# Patient Record
Sex: MALE | Race: BLACK OR AFRICAN AMERICAN | NOT HISPANIC OR LATINO | Employment: OTHER | URBAN - METROPOLITAN AREA
[De-identification: names, ages, dates, MRNs, and addresses within clinical notes are randomized per-mention and may not be internally consistent; named-entity substitution may affect disease eponyms.]

---

## 2018-12-04 ENCOUNTER — HOSPITAL ENCOUNTER (EMERGENCY)
Facility: OTHER | Age: 53
Discharge: HOME | End: 2018-12-04
Attending: EMERGENCY MEDICINE
Payer: MEDICAID

## 2018-12-04 VITALS
HEART RATE: 80 BPM | OXYGEN SATURATION: 96 % | SYSTOLIC BLOOD PRESSURE: 119 MMHG | TEMPERATURE: 98 F | WEIGHT: 168 LBS | HEIGHT: 66 IN | RESPIRATION RATE: 18 BRPM | BODY MASS INDEX: 27 KG/M2 | DIASTOLIC BLOOD PRESSURE: 57 MMHG

## 2018-12-04 DIAGNOSIS — J45.901 EXACERBATION OF ASTHMA, UNSPECIFIED ASTHMA SEVERITY, UNSPECIFIED WHETHER PERSISTENT: Primary | ICD-10-CM

## 2018-12-04 DIAGNOSIS — R05.9 COUGH: ICD-10-CM

## 2018-12-04 DIAGNOSIS — R91.1 PULMONARY NODULE: ICD-10-CM

## 2018-12-04 LAB — BNP SERPL-MCNC: 75 PG/ML

## 2018-12-04 PROCEDURE — 99285 EMERGENCY DEPT VISIT HI MDM: CPT | Mod: 25

## 2018-12-04 PROCEDURE — 63600175 PHARM REV CODE 636 W HCPCS: Performed by: PHYSICIAN ASSISTANT

## 2018-12-04 PROCEDURE — 83880 ASSAY OF NATRIURETIC PEPTIDE: CPT

## 2018-12-04 PROCEDURE — 25000242 PHARM REV CODE 250 ALT 637 W/ HCPCS: Performed by: PHYSICIAN ASSISTANT

## 2018-12-04 PROCEDURE — 94640 AIRWAY INHALATION TREATMENT: CPT

## 2018-12-04 RX ORDER — IPRATROPIUM BROMIDE AND ALBUTEROL SULFATE 2.5; .5 MG/3ML; MG/3ML
3 SOLUTION RESPIRATORY (INHALATION)
Status: COMPLETED | OUTPATIENT
Start: 2018-12-04 | End: 2018-12-04

## 2018-12-04 RX ORDER — PREDNISONE 20 MG/1
60 TABLET ORAL
Status: COMPLETED | OUTPATIENT
Start: 2018-12-04 | End: 2018-12-04

## 2018-12-04 RX ORDER — ALBUTEROL SULFATE 90 UG/1
1-2 AEROSOL, METERED RESPIRATORY (INHALATION) EVERY 6 HOURS PRN
Qty: 1 INHALER | Refills: 0 | Status: SHIPPED | OUTPATIENT
Start: 2018-12-04 | End: 2019-12-04

## 2018-12-04 RX ORDER — PREDNISONE 20 MG/1
60 TABLET ORAL DAILY
Qty: 12 TABLET | Refills: 0 | Status: SHIPPED | OUTPATIENT
Start: 2018-12-05 | End: 2018-12-09

## 2018-12-04 RX ADMIN — IPRATROPIUM BROMIDE AND ALBUTEROL SULFATE 3 ML: .5; 3 SOLUTION RESPIRATORY (INHALATION) at 09:12

## 2018-12-04 RX ADMIN — IPRATROPIUM BROMIDE AND ALBUTEROL SULFATE 3 ML: .5; 3 SOLUTION RESPIRATORY (INHALATION) at 08:12

## 2018-12-04 RX ADMIN — PREDNISONE 60 MG: 20 TABLET ORAL at 10:12

## 2018-12-05 ENCOUNTER — PES CALL (OUTPATIENT)
Dept: ADMINISTRATIVE | Facility: CLINIC | Age: 53
End: 2018-12-05

## 2018-12-05 NOTE — ED TRIAGE NOTES
Pt reports hx of asthma, reports out of his inhaler, shortness of breath started last night. Reports wheezes. Pt is AAO x 3, answers questions appropriately.

## 2018-12-05 NOTE — ED PROVIDER NOTES
"Encounter Date: 12/4/2018       History     Chief Complaint   Patient presents with    Asthma     x1 day pta, "I ran out of my inhaler medicine"      Patient is a 52-year-old male with history of asthma who presents to the emergency department with shortness of breath. Patient states he was recently released from group home.  He states when being released, he was not given his prescriptions for daily inhaled steroid and albuterol.  He states he has been hospitalized in the past for his asthma.  He states when the weather changes, his asthma always worsens.  He states since yesterday, he has been having chest tightness and wheezing.  He denies chest pain.  He denies fevers or chills.  He reports cough with no sputum production.      The history is provided by the patient.     Review of patient's allergies indicates:  No Known Allergies  Past Medical History:   Diagnosis Date    Asthma      History reviewed. No pertinent surgical history.  History reviewed. No pertinent family history.  Social History     Tobacco Use    Smoking status: Never Smoker    Smokeless tobacco: Current User     Types: Chew   Substance Use Topics    Alcohol use: Yes    Drug use: Not on file     Review of Systems   Constitutional: Negative for activity change, appetite change, chills, fatigue and fever.   HENT: Negative for congestion, ear discharge, ear pain, postnasal drip, rhinorrhea, sore throat and trouble swallowing.    Respiratory: Positive for cough, chest tightness, shortness of breath and wheezing.    Cardiovascular: Negative for chest pain.   Gastrointestinal: Negative for abdominal pain, blood in stool, constipation, diarrhea, nausea and vomiting.   Genitourinary: Negative for dysuria, flank pain and hematuria.   Musculoskeletal: Negative for back pain, neck pain and neck stiffness.   Skin: Negative for rash and wound.   Neurological: Negative for dizziness, weakness, light-headedness and headaches.       Physical Exam     Initial " Vitals [12/04/18 2026]   BP Pulse Resp Temp SpO2   136/63 63 (!) 22 98.2 °F (36.8 °C) 97 %      MAP       --         Physical Exam    Nursing note and vitals reviewed.  Constitutional: He appears well-developed and well-nourished. He is not diaphoretic.  Non-toxic appearance. No distress.   HENT:   Head: Normocephalic.   Right Ear: Hearing and external ear normal.   Left Ear: Hearing and external ear normal.   Nose: Nose normal.   Mouth/Throat: Uvula is midline, oropharynx is clear and moist and mucous membranes are normal. No oropharyngeal exudate.   Eyes: Conjunctivae are normal. Pupils are equal, round, and reactive to light.   Neck: Normal range of motion.   Cardiovascular: Normal rate and regular rhythm.   1+ pitting edema bilaterally   Pulmonary/Chest: He has wheezes.   Diminished breath sounds bilaterally   Abdominal: Soft. Bowel sounds are normal. There is no tenderness.   Lymphadenopathy:     He has no cervical adenopathy.   Neurological: He is oriented to person, place, and time.   Skin: Skin is warm and dry. Capillary refill takes less than 2 seconds.   Psychiatric: He has a normal mood and affect.         ED Course   Procedures  Labs Reviewed   B-TYPE NATRIURETIC PEPTIDE          Imaging Results          X-Ray Chest PA And Lateral (Final result)     Abnormal  Result time 12/04/18 21:30:01    Final result by Hunter Torres MD (12/04/18 21:30:01)                 Impression:      No radiographic acute intrathoracic process seen.  Specifically, no focal consolidation.    Bibasilar solitary small nodular densities which could represent vessels on end but remain indeterminate.  Further evaluation with elective/nonemergent CT thorax can be obtained, if 2 year stability cannot be confirmed.    This report was flagged in Epic as abnormal.      Electronically signed by: Hunter Torres MD  Date:    12/04/2018  Time:    21:30             Narrative:    EXAMINATION:  XR CHEST PA AND LATERAL    CLINICAL  HISTORY:  Cough    TECHNIQUE:  PA and lateral views of the chest were performed.    COMPARISON:  None    FINDINGS:  Cardiomediastinal silhouette is midline and within normal limits.  Pulmonary vasculature and hilar regions are within normal limits.  3 mm nodular density projected over the right mid to lower lung zone on the frontal view and 3 mm nodular density projected over the left lung base on the frontal view.  The lungs are otherwise symmetrically well expanded without large consolidation, pleural effusion or pneumothorax.  Osseous structures show degenerative change without acute or destructive process seen.                              X-Rays:   Independently Interpreted Readings:   Other Readings:  Bibasilar solitary small nodular densities    Medical Decision Making:   Initial Assessment:   Urgent evaluation of a 52-year-old male with history of asthma who presents to the emergency department with wheezing.  Patient is afebrile, nontoxic appearing, and hemodynamically stable.  On lung auscultation, patient has diminished breath sounds bilaterally.  He has diffuse wheezing bilaterally. On exam, patient does have 1+ pitting edema in lower extremities bilaterally.  When questioned about this, patient states he is unsure.  He states he has been on his feet all day.  Although patient has history of asthma and this feels like patient's typical asthma exacerbation, I will obtain chest x-ray and BNP to rule out heart failure.  Patient will be given breathing treatments and prednisone.  ED Management:  On re-evaluation, patient is still wheezing and does not sound much better.  Will order 3 more breathing treatments.    On re-evaluation, patient sounds much better.  BNP is negative.  Chest x-ray does show incidental finding of pulmonary nodules.  Patient is counseled on all findings.  He will be sent home with burst of steroids and albuterol.  He is advised to establish primary care.  He is advised to relay chest  x-ray results to primary care for follow-up.  He is advised to return to the emergency department with any worsening symptoms or concerns.                      Clinical Impression:   The primary encounter diagnosis was Exacerbation of asthma, unspecified asthma severity, unspecified whether persistent. A diagnosis of Cough was also pertinent to this visit.                             Tisha Diego PA-C  12/05/18 0039

## 2018-12-08 ENCOUNTER — HOSPITAL ENCOUNTER (EMERGENCY)
Facility: OTHER | Age: 53
Discharge: HOME | End: 2018-12-08
Attending: EMERGENCY MEDICINE
Payer: MEDICAID

## 2018-12-08 VITALS
HEART RATE: 73 BPM | RESPIRATION RATE: 18 BRPM | BODY MASS INDEX: 26.68 KG/M2 | OXYGEN SATURATION: 95 % | DIASTOLIC BLOOD PRESSURE: 58 MMHG | TEMPERATURE: 99 F | SYSTOLIC BLOOD PRESSURE: 128 MMHG | WEIGHT: 170 LBS | HEIGHT: 67 IN

## 2018-12-08 DIAGNOSIS — J45.21 MILD INTERMITTENT ASTHMA WITH ACUTE EXACERBATION: Primary | ICD-10-CM

## 2018-12-08 PROCEDURE — 99283 EMERGENCY DEPT VISIT LOW MDM: CPT

## 2018-12-08 PROCEDURE — 94761 N-INVAS EAR/PLS OXIMETRY MLT: CPT

## 2018-12-08 PROCEDURE — 25000242 PHARM REV CODE 250 ALT 637 W/ HCPCS: Performed by: EMERGENCY MEDICINE

## 2018-12-08 PROCEDURE — 94640 AIRWAY INHALATION TREATMENT: CPT

## 2018-12-08 RX ORDER — ALBUTEROL SULFATE 0.83 MG/ML
2.5 SOLUTION RESPIRATORY (INHALATION)
Status: COMPLETED | OUTPATIENT
Start: 2018-12-08 | End: 2018-12-08

## 2018-12-08 RX ADMIN — ALBUTEROL SULFATE 2.5 MG: 2.5 SOLUTION RESPIRATORY (INHALATION) at 08:12
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
## 2018-12-09 NOTE — ED TRIAGE NOTES
Pt to ED with CO asthma exacerbation Xs 30 min PTA. PT sattes he is unable to refill his MDI, but has been compliant with steroid Rx. NAD noted, respirations even and unlabored.

## 2020-11-06 ENCOUNTER — HOSPITAL ENCOUNTER (EMERGENCY)
Facility: OTHER | Age: 55
Discharge: HOME | End: 2020-11-06
Attending: EMERGENCY MEDICINE
Payer: MEDICAID

## 2020-11-06 VITALS
TEMPERATURE: 97 F | HEIGHT: 66 IN | DIASTOLIC BLOOD PRESSURE: 66 MMHG | WEIGHT: 160 LBS | SYSTOLIC BLOOD PRESSURE: 149 MMHG | OXYGEN SATURATION: 100 % | HEART RATE: 67 BPM | BODY MASS INDEX: 25.71 KG/M2 | RESPIRATION RATE: 20 BRPM

## 2020-11-06 DIAGNOSIS — M25.569 KNEE PAIN: ICD-10-CM

## 2020-11-06 DIAGNOSIS — R07.9 CHEST PAIN: ICD-10-CM

## 2020-11-06 DIAGNOSIS — G89.29 CHRONIC PAIN OF LEFT KNEE: ICD-10-CM

## 2020-11-06 DIAGNOSIS — M25.562 CHRONIC PAIN OF LEFT KNEE: ICD-10-CM

## 2020-11-06 DIAGNOSIS — R06.02 SHORTNESS OF BREATH: ICD-10-CM

## 2020-11-06 DIAGNOSIS — J45.901 EXACERBATION OF ASTHMA, UNSPECIFIED ASTHMA SEVERITY, UNSPECIFIED WHETHER PERSISTENT: Primary | ICD-10-CM

## 2020-11-06 LAB
BASOPHILS # BLD AUTO: 0.08 K/UL (ref 0–0.2)
BASOPHILS NFR BLD: 0.9 % (ref 0–1.9)
DIFFERENTIAL METHOD: ABNORMAL
EOSINOPHIL # BLD AUTO: 0.4 K/UL (ref 0–0.5)
EOSINOPHIL NFR BLD: 4.3 % (ref 0–8)
ERYTHROCYTE [DISTWIDTH] IN BLOOD BY AUTOMATED COUNT: 13.4 % (ref 11.5–14.5)
HCT VFR BLD AUTO: 47.2 % (ref 40–54)
HGB BLD-MCNC: 14.9 G/DL (ref 14–18)
IMM GRANULOCYTES # BLD AUTO: 0.02 K/UL (ref 0–0.04)
IMM GRANULOCYTES NFR BLD AUTO: 0.2 % (ref 0–0.5)
LYMPHOCYTES # BLD AUTO: 1 K/UL (ref 1–4.8)
LYMPHOCYTES NFR BLD: 10.9 % (ref 18–48)
MCH RBC QN AUTO: 28.2 PG (ref 27–31)
MCHC RBC AUTO-ENTMCNC: 31.6 G/DL (ref 32–36)
MCV RBC AUTO: 89 FL (ref 82–98)
MONOCYTES # BLD AUTO: 0.4 K/UL (ref 0.3–1)
MONOCYTES NFR BLD: 4.4 % (ref 4–15)
NEUTROPHILS # BLD AUTO: 7.1 K/UL (ref 1.8–7.7)
NEUTROPHILS NFR BLD: 79.3 % (ref 38–73)
NRBC BLD-RTO: 0 /100 WBC
PLATELET # BLD AUTO: 232 K/UL (ref 150–350)
PMV BLD AUTO: 12.3 FL (ref 9.2–12.9)
RBC # BLD AUTO: 5.29 M/UL (ref 4.6–6.2)
WBC # BLD AUTO: 8.99 K/UL (ref 3.9–12.7)

## 2020-11-06 PROCEDURE — 93005 ELECTROCARDIOGRAM TRACING: CPT

## 2020-11-06 PROCEDURE — 25000242 PHARM REV CODE 250 ALT 637 W/ HCPCS: Performed by: EMERGENCY MEDICINE

## 2020-11-06 PROCEDURE — 85025 COMPLETE CBC W/AUTO DIFF WBC: CPT

## 2020-11-06 PROCEDURE — 94640 AIRWAY INHALATION TREATMENT: CPT

## 2020-11-06 PROCEDURE — 93010 EKG 12-LEAD: ICD-10-PCS | Mod: ,,, | Performed by: INTERNAL MEDICINE

## 2020-11-06 PROCEDURE — 99285 EMERGENCY DEPT VISIT HI MDM: CPT | Mod: 25

## 2020-11-06 PROCEDURE — 94761 N-INVAS EAR/PLS OXIMETRY MLT: CPT

## 2020-11-06 PROCEDURE — 63600175 PHARM REV CODE 636 W HCPCS: Performed by: EMERGENCY MEDICINE

## 2020-11-06 PROCEDURE — 93010 ELECTROCARDIOGRAM REPORT: CPT | Mod: ,,, | Performed by: INTERNAL MEDICINE

## 2020-11-06 RX ORDER — PREDNISONE 20 MG/1
60 TABLET ORAL DAILY
Qty: 12 TABLET | Refills: 0 | Status: SHIPPED | OUTPATIENT
Start: 2020-11-06 | End: 2020-11-10

## 2020-11-06 RX ORDER — IPRATROPIUM BROMIDE AND ALBUTEROL SULFATE 2.5; .5 MG/3ML; MG/3ML
3 SOLUTION RESPIRATORY (INHALATION)
Status: COMPLETED | OUTPATIENT
Start: 2020-11-06 | End: 2020-11-06

## 2020-11-06 RX ORDER — ALBUTEROL SULFATE 90 UG/1
1-2 AEROSOL, METERED RESPIRATORY (INHALATION) EVERY 4 HOURS PRN
Qty: 18 G | Refills: 1 | Status: SHIPPED | OUTPATIENT
Start: 2020-11-06 | End: 2021-11-06

## 2020-11-06 RX ORDER — PREDNISONE 20 MG/1
60 TABLET ORAL
Status: COMPLETED | OUTPATIENT
Start: 2020-11-06 | End: 2020-11-06

## 2020-11-06 RX ADMIN — IPRATROPIUM BROMIDE AND ALBUTEROL SULFATE 3 ML: .5; 3 SOLUTION RESPIRATORY (INHALATION) at 11:11

## 2020-11-06 RX ADMIN — IPRATROPIUM BROMIDE AND ALBUTEROL SULFATE 3 ML: .5; 3 SOLUTION RESPIRATORY (INHALATION) at 12:11

## 2020-11-06 RX ADMIN — PREDNISONE 60 MG: 20 TABLET ORAL at 11:11

## 2020-11-06 NOTE — ED TRIAGE NOTES
Pt reports to the ED with complaints of sob related to asthma and chest/ left arm pain x 3 days. Pt reports 2 asthma attacks over the last 3 days. Pt has a productive couPt states he is out of his inhaler. Paty.
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
## 2020-11-08 NOTE — PROVIDER PROGRESS NOTES - EMERGENCY DEPT.
Encounter Date: 11/6/2020    ED Physician Progress Notes        Physician Note:   After reviewing the results of the x-ray in the questionable lytic lesion on the distal femur\, I did call the patient and I spoke with him on the phone.  I let him know of the lesion and recommended that he follow up with either primary care or Orthopedics.  I had discharged him with follow-up with both of these clinics when I saw him 2 days ago.  I explained to him the possibility that this could be a cancer tumor and that he needs to make sure he does not ignore it and does follow-up.  I answered all questions.    Regarding his asthma he states he is feeling much better.

## 2020-12-29 ENCOUNTER — TELEPHONE (OUTPATIENT)
Dept: EMERGENCY MEDICINE | Facility: OTHER | Age: 55
End: 2020-12-29

## 2020-12-29 DIAGNOSIS — J45.909 ASTHMA, UNSPECIFIED ASTHMA SEVERITY, UNSPECIFIED WHETHER COMPLICATED, UNSPECIFIED WHETHER PERSISTENT: Primary | ICD-10-CM
